# Patient Record
Sex: MALE | Race: WHITE | NOT HISPANIC OR LATINO | URBAN - NONMETROPOLITAN AREA
[De-identification: names, ages, dates, MRNs, and addresses within clinical notes are randomized per-mention and may not be internally consistent; named-entity substitution may affect disease eponyms.]

---

## 2019-02-25 ENCOUNTER — IMPORTED ENCOUNTER (OUTPATIENT)
Dept: URBAN - NONMETROPOLITAN AREA CLINIC 1 | Facility: CLINIC | Age: 84
End: 2019-02-25

## 2019-02-25 PROBLEM — H25.813: Noted: 2019-02-25

## 2019-02-25 PROCEDURE — 92014 COMPRE OPH EXAM EST PT 1/>: CPT

## 2019-02-25 NOTE — PATIENT DISCUSSION
Cataract(s)-Visually significant cataract OU .-Cataract(s) causing symptomatic impairment of visual function not correctable with a tolerable change in glasses or contact lenses lighting or non-operative means resulting in specific activity limitations and/or participation restrictions including but not limited to reading viewing television driving or meeting vocational or recreational needs. -Expectation is clearer vision and functional improvement in symptoms as well as reduced glare disability after cataract removal.-Order IOLMaster and OPD today. -Recommend Standard/Trad based on today's OPD testing and lifestyle questionnaire.-All questions were answered regarding surgery including pre and post-op medications appointments activity restrictions and anesthetic usage.-The risks benefits and alternatives and special risk factors for the patient were discussed in detail including but not limited to: bleeding infection retinal detachment vitreous loss problems with the implant and possible need for additional surgery.-Although rare the possibility of complete vision loss was discussed.-The possible need for glasses post-operatively was discussed.-Order H&P-Patient elects to proceed with cataract surgery OD . Will schedule at patient's convenience and re-evaluate OS  in the future.

## 2019-03-06 ENCOUNTER — IMPORTED ENCOUNTER (OUTPATIENT)
Dept: URBAN - NONMETROPOLITAN AREA CLINIC 1 | Facility: CLINIC | Age: 84
End: 2019-03-06

## 2019-03-12 ENCOUNTER — IMPORTED ENCOUNTER (OUTPATIENT)
Dept: URBAN - NONMETROPOLITAN AREA CLINIC 1 | Facility: CLINIC | Age: 84
End: 2019-03-12

## 2019-03-12 PROBLEM — Z96.1: Noted: 2019-03-12

## 2019-03-12 NOTE — PATIENT DISCUSSION
CE IOL OD Stand/Trad 3/11/2019-  discussed findings w/patient-  continue post op gtts/instructions-  IOP elevated today start alphagan p 0.1% bid OD only-  RTC 1 week as scheduled or prn

## 2019-03-18 ENCOUNTER — IMPORTED ENCOUNTER (OUTPATIENT)
Dept: URBAN - NONMETROPOLITAN AREA CLINIC 1 | Facility: CLINIC | Age: 84
End: 2019-03-18

## 2019-03-18 PROCEDURE — 99024 POSTOP FOLLOW-UP VISIT: CPT

## 2019-03-18 PROCEDURE — 92012 INTRM OPH EXAM EST PATIENT: CPT

## 2019-03-18 PROCEDURE — 92134 CPTRZ OPH DX IMG PST SGM RTA: CPT

## 2019-03-18 NOTE — PATIENT DISCUSSION
Cataract(s)-Visually significant cataract OS. -Cataract(s) causing symptomatic impairment of visual function not correctable with a tolerable change in glasses or contact lenses lighting or non-operative means resulting in specific activity limitations and/or participation restrictions including but not limited to reading viewing television driving or meeting vocational or recreational needs. -Expectation is clearer vision and functional improvement in symptoms as well as reduced glare disability after cataract removal.-Recommend Stand/Trad based on previous OPD testing and lifestyle questionnaire.-All questions were answered regarding surgery including pre and post-op medications appointments activity restrictions and anesthetic usage.-The risks benefits and alternatives and special risk factors for the patient were discussed in detail including but not limited to: bleeding infection retinal detachment vitreous loss problems with the implant and possible need for additional surgery.-Although rare the possibility of complete vision loss was discussed.-The need for glasses post-operatively was discussed.-Patient elects to proceed with cataract surgery OS. Will schedule at patient's convenience. s/p PCIOL-Order OCT MAC performed and reviewed trauma and ERM-Pt doing well at 1 week s/p PCIOL. -Continue post-op gtts according to instruction sheet. -CONT Alphagan bid OD IOP 19:22-Okay to resume usual activites and d/c eye shield.

## 2019-03-19 PROBLEM — H35.371: Noted: 2019-03-19

## 2019-03-19 PROBLEM — Z98.41: Noted: 2019-03-18

## 2019-03-19 PROBLEM — H25.812: Noted: 2019-03-18

## 2019-08-26 ENCOUNTER — IMPORTED ENCOUNTER (OUTPATIENT)
Dept: URBAN - NONMETROPOLITAN AREA CLINIC 1 | Facility: CLINIC | Age: 84
End: 2019-08-26

## 2019-08-26 PROBLEM — H35.371: Noted: 2019-08-26

## 2019-08-26 PROBLEM — H25.812: Noted: 2019-08-26

## 2019-08-26 PROCEDURE — 92014 COMPRE OPH EXAM EST PT 1/>: CPT

## 2019-08-26 NOTE — PATIENT DISCUSSION
Cataract(s)-Visually significant cataract OS. -Cataract(s) causing symptomatic impairment of visual function not correctable with a tolerable change in glasses or contact lenses lighting or non-operative means resulting in specific activity limitations and/or participation restrictions including but not limited to reading viewing television driving or meeting vocational or recreational needs. -Expectation is clearer vision and functional improvement in symptoms as well as reduced glare disability after cataract removal.-Recommend Stand/Trad based on previous OPD testing and lifestyle questionnaire.-All questions were answered regarding surgery including pre and post-op medications appointments activity restrictions and anesthetic usage.-The risks benefits and alternatives and special risk factors for the patient were discussed in detail including but not limited to: bleeding infection retinal detachment vitreous loss problems with the implant and possible need for additional surgery.-Although rare the possibility of complete vision loss was discussed.-The need for glasses post-operatively was discussed.-Patient elects to proceed with cataract surgery OS. Will schedule at patient's convenience. ERM/Mac Scar OD secondary to golf club injury. Lengthy discussion with patient regarding diagnosis and tx options. Refer to Dr. Miquel Magallanes in the [facility] office for evaluation of retina OD.

## 2019-09-11 PROBLEM — H35.371: Noted: 2019-09-11

## 2019-09-11 PROBLEM — H25.812: Noted: 2019-09-11

## 2019-09-17 ENCOUNTER — IMPORTED ENCOUNTER (OUTPATIENT)
Dept: URBAN - NONMETROPOLITAN AREA CLINIC 1 | Facility: CLINIC | Age: 84
End: 2019-09-17

## 2019-10-14 ENCOUNTER — IMPORTED ENCOUNTER (OUTPATIENT)
Dept: URBAN - NONMETROPOLITAN AREA CLINIC 1 | Facility: CLINIC | Age: 84
End: 2019-10-14

## 2019-10-14 PROCEDURE — 99024 POSTOP FOLLOW-UP VISIT: CPT

## 2019-10-14 PROCEDURE — 92136 OPHTHALMIC BIOMETRY: CPT

## 2019-10-14 PROCEDURE — 66984 XCAPSL CTRC RMVL W/O ECP: CPT

## 2019-10-14 NOTE — PATIENT DISCUSSION
s/p PCIOL-Pt doing well s/p PCIOL. -Continue post-op gtts according to instruction sheet and sleep with eye shield over eye for 7 nights.-Avoid bending at the waist lifting anything over 5lbs and dirty or mira environments. -RX given NACL QID OS x  1wk-RTC KNA.

## 2019-10-18 PROBLEM — Z98.41: Noted: 2019-10-18

## 2019-10-18 PROBLEM — Z98.42: Noted: 2019-10-18

## 2019-10-18 PROBLEM — H35.371: Noted: 2019-10-14

## 2019-10-21 ENCOUNTER — IMPORTED ENCOUNTER (OUTPATIENT)
Dept: URBAN - NONMETROPOLITAN AREA CLINIC 1 | Facility: CLINIC | Age: 84
End: 2019-10-21

## 2019-10-21 PROCEDURE — 99024 POSTOP FOLLOW-UP VISIT: CPT

## 2019-10-21 NOTE — PATIENT DISCUSSION
s/p PCIOL-Pt doing well s/p PCIOL-Continue post-op gtts according to instruction sheet  when Pt runs out of Dl Jay is to start Prolensa

## 2019-11-12 ENCOUNTER — IMPORTED ENCOUNTER (OUTPATIENT)
Dept: URBAN - NONMETROPOLITAN AREA CLINIC 1 | Facility: CLINIC | Age: 84
End: 2019-11-12

## 2019-11-12 PROCEDURE — 92015 DETERMINE REFRACTIVE STATE: CPT

## 2019-11-12 PROCEDURE — 99024 POSTOP FOLLOW-UP VISIT: CPT

## 2019-11-12 NOTE — PATIENT DISCUSSION
s/p PC IOL OU-  discussed findings w/patient-  Pt better at this time-  UV protection recommended-  new spectacle Rx issued patient must wear glasses for driving-  RTC 3 mo DFE or prn

## 2020-01-10 ENCOUNTER — IMPORTED ENCOUNTER (OUTPATIENT)
Dept: URBAN - NONMETROPOLITAN AREA CLINIC 1 | Facility: CLINIC | Age: 85
End: 2020-01-10

## 2020-01-10 PROBLEM — H35.371: Noted: 2020-01-10

## 2020-01-10 PROBLEM — Z98.41: Noted: 2020-01-10

## 2020-01-10 PROBLEM — H59.033: Noted: 2020-01-10

## 2020-01-10 PROBLEM — Z98.42: Noted: 2020-01-10

## 2020-01-10 PROCEDURE — 99024 POSTOP FOLLOW-UP VISIT: CPT

## 2020-01-10 NOTE — PATIENT DISCUSSION
CME OU 2* to cataract surgery-  discussed findings w/patient-  MR done today: do not recommend updating glasses -  Recommend OCT today: extensive CME secondary to cat sx -  Recommend using Pred Forte QID OU and Ketorolac QID OU stressed importance of compliance.; 's Notes: MR 1/10/2020DFE OCT Mac 1/10/2020

## 2020-02-11 ENCOUNTER — IMPORTED ENCOUNTER (OUTPATIENT)
Dept: URBAN - NONMETROPOLITAN AREA CLINIC 1 | Facility: CLINIC | Age: 85
End: 2020-02-11

## 2020-02-11 PROBLEM — Z96.1: Noted: 2020-02-11

## 2020-02-11 PROBLEM — H59.033: Noted: 2020-01-10

## 2020-02-11 PROBLEM — H35.371: Noted: 2020-02-11

## 2020-02-11 PROCEDURE — 99024 POSTOP FOLLOW-UP VISIT: CPT

## 2020-02-11 NOTE — PATIENT DISCUSSION
CME OU 2* to cataract surgery-  discussed findings w/patient-  findings are worse OD slightly better OS- patient has been out of drops over a week did not call because they do not have the money to buy more drops at this time-  will give patient BromSite samples today for QD OU use-  will also give patient Lotemax SM samples today for BID OU use-  emphasized the importance of NOT running out of drops so that this condition can get better-  RTC 1 mo w/OCT Mac; 's Notes: MR 1/10/2020DFE 2/11/2020OCT Mac 2/11/2020

## 2020-03-13 ENCOUNTER — IMPORTED ENCOUNTER (OUTPATIENT)
Dept: URBAN - NONMETROPOLITAN AREA CLINIC 1 | Facility: CLINIC | Age: 85
End: 2020-03-13

## 2020-03-13 PROBLEM — Z96.1: Noted: 2020-03-13

## 2020-03-13 PROBLEM — H59.033: Noted: 2020-01-10

## 2020-03-13 PROBLEM — H35.371: Noted: 2020-03-13

## 2020-03-13 PROCEDURE — 99213 OFFICE O/P EST LOW 20 MIN: CPT

## 2020-03-13 PROCEDURE — 92134 CPTRZ OPH DX IMG PST SGM RTA: CPT

## 2020-03-13 NOTE — PATIENT DISCUSSION
CME OU 2* to cataract surgery-  discussed findings w/patient-  patient is very inconsistent with drops d/t lack of money-  OCT shows no improvement today-  patient has been out of drops for the last 4 days or longer-  continue to emphasize the importance of compliance with drops patient's wife says that after they pay their bills they have $160 left over for the  month for food and they can't pay for any drops.; 's Notes: MR 1/10/2020DFE 2/11/2020OCT Mac 2/11/2020

## 2022-04-10 ASSESSMENT — TONOMETRY
OD_IOP_MMHG: 20
OS_IOP_MMHG: 16
OS_IOP_MMHG: 18
OS_IOP_MMHG: 22
OS_IOP_MMHG: 18
OD_IOP_MMHG: 16
OD_IOP_MMHG: 36
OD_IOP_MMHG: 20
OD_IOP_MMHG: 19
OD_IOP_MMHG: 18
OS_IOP_MMHG: 18
OS_IOP_MMHG: 20
OS_IOP_MMHG: 26
OD_IOP_MMHG: 18
OD_IOP_MMHG: 18
OS_IOP_MMHG: 18

## 2022-04-10 ASSESSMENT — VISUAL ACUITY
OS_SC: 20/30
OU_SC: 20/30-2
OS_SC: 20/30-1
OD_SC: 20/40-2
OS_PH: 20/30
OD_SC: 20/30
OS_CC: 20/400
OS_PH: 20/100
OS_SC: 20/40
OD_CC: 20/80
OU_SC: 20/40
OS_SC: 20/30
OD_CC: 20/50
OD_CC: 20/70
OD_PH: 20/80
OD_PAM: 20/30
OS_SC: 20/30
OS_CC: 20/40+3
OD_CC: 20/80-
OS_PH: 20/30
OS_AM: 20/20
OD_SC: 20/30-2
OS_PH: 20/40+
OD_GLARE: 20/400
OS_GLARE: 20/100
OS_CC: 20/40-
OS_CC: 20/40
OS_CC: 20/30-2
OS_CC: 20/40
OS_AM: 20/30
OD_PH: 20/70
OS_AM: 20/25
OD_CC: 20/70+2
OD_CC: 20/70
OD_SC: 20/70-2
OS_CC: 20/60-2